# Patient Record
Sex: FEMALE | Race: WHITE
[De-identification: names, ages, dates, MRNs, and addresses within clinical notes are randomized per-mention and may not be internally consistent; named-entity substitution may affect disease eponyms.]

---

## 2017-01-03 ENCOUNTER — HOSPITAL ENCOUNTER (OUTPATIENT)
Dept: HOSPITAL 45 - C.LAB1850 | Age: 27
Discharge: HOME | End: 2017-01-03
Attending: OBSTETRICS & GYNECOLOGY
Payer: COMMERCIAL

## 2017-01-03 DIAGNOSIS — Z3A.00: ICD-10-CM

## 2017-01-03 DIAGNOSIS — O09.299: Primary | ICD-10-CM

## 2017-01-03 LAB — GTGD: 50 GRAMS

## 2017-03-28 ENCOUNTER — HOSPITAL ENCOUNTER (OUTPATIENT)
Dept: HOSPITAL 45 - C.LABSPEC | Age: 27
Discharge: HOME | End: 2017-03-28
Attending: OBSTETRICS & GYNECOLOGY
Payer: COMMERCIAL

## 2017-03-28 ENCOUNTER — HOSPITAL ENCOUNTER (OUTPATIENT)
Dept: HOSPITAL 45 - C.LAB1850 | Age: 27
Discharge: HOME | End: 2017-03-28
Attending: OBSTETRICS & GYNECOLOGY
Payer: COMMERCIAL

## 2017-03-28 DIAGNOSIS — O09.291: Primary | ICD-10-CM

## 2017-03-28 LAB
APPEARANCE UR: CLEAR
BILIRUB UR-MCNC: (no result) MG/DL
COLOR UR: YELLOW
GTGD: 50 GRAMS
HCT VFR BLD CALC: 32.4 % (ref 37–47)
MANUAL MICROSCOPIC REQUIRED?: NO
NITRITE UR QL STRIP: (no result)
PH UR STRIP: 7 [PH] (ref 4.5–7.5)
REVIEW REQ?: NO
SP GR UR STRIP: 1.01 (ref 1–1.03)
URINE EPITHELIAL CELL AUTO: (no result) /LPF (ref 0–5)
UROBILINOGEN UR-MCNC: (no result) MG/DL

## 2017-04-17 ENCOUNTER — HOSPITAL ENCOUNTER (OUTPATIENT)
Dept: HOSPITAL 45 - C.LAB1850 | Age: 27
Discharge: HOME | End: 2017-04-17
Attending: OBSTETRICS & GYNECOLOGY
Payer: COMMERCIAL

## 2017-04-17 DIAGNOSIS — R39.9: Primary | ICD-10-CM

## 2017-04-17 LAB
APPEARANCE UR: CLEAR
BILIRUB UR-MCNC: (no result) MG/DL
COLOR UR: YELLOW
MANUAL MICROSCOPIC REQUIRED?: NO
NITRITE UR QL STRIP: (no result)
PH UR STRIP: 8 [PH] (ref 4.5–7.5)
REVIEW REQ?: NO
SP GR UR STRIP: 1 (ref 1–1.03)
URINE BILL WITH OR WITHOUT MIC: (no result)
UROBILINOGEN UR-MCNC: (no result) MG/DL

## 2017-05-23 ENCOUNTER — HOSPITAL ENCOUNTER (OUTPATIENT)
Dept: HOSPITAL 45 - C.LABSPEC | Age: 27
Discharge: HOME | End: 2017-05-23
Attending: OBSTETRICS & GYNECOLOGY
Payer: COMMERCIAL

## 2017-05-23 DIAGNOSIS — Z34.93: Primary | ICD-10-CM

## 2017-06-17 ENCOUNTER — HOSPITAL ENCOUNTER (INPATIENT)
Dept: HOSPITAL 45 - C.LD | Age: 27
LOS: 13 days | Discharge: HOME | End: 2017-06-30
Attending: OBSTETRICS & GYNECOLOGY | Admitting: OBSTETRICS & GYNECOLOGY
Payer: COMMERCIAL

## 2017-06-17 VITALS
BODY MASS INDEX: 34.6 KG/M2 | WEIGHT: 220.46 LBS | HEIGHT: 67.01 IN | WEIGHT: 220.46 LBS | BODY MASS INDEX: 34.6 KG/M2 | HEIGHT: 67.01 IN

## 2017-06-17 DIAGNOSIS — Z3A.41: ICD-10-CM

## 2017-06-17 DIAGNOSIS — J45.909: ICD-10-CM

## 2017-06-17 DIAGNOSIS — O48.0: Primary | ICD-10-CM

## 2017-06-27 LAB
EOSINOPHIL NFR BLD AUTO: 223 K/UL (ref 130–400)
HCT VFR BLD CALC: 37 % (ref 37–47)
MCH RBC QN AUTO: 29.4 PG (ref 25–34)
MCHC RBC AUTO-ENTMCNC: 33.2 G/DL (ref 32–36)
MCV RBC AUTO: 88.5 FL (ref 80–100)
PMV BLD AUTO: 9.8 FL (ref 7.4–10.4)
RBC # BLD AUTO: 4.18 M/UL (ref 4.2–5.4)
WBC # BLD AUTO: 9.96 K/UL (ref 4.8–10.8)

## 2017-06-27 RX ADMIN — SODIUM CHLORIDE, SODIUM LACTATE, POTASSIUM CHLORIDE, AND CALCIUM CHLORIDE SCH MLS/HR: 600; 310; 30; 20 INJECTION, SOLUTION INTRAVENOUS at 08:42

## 2017-06-27 RX ADMIN — Medication PRN UNITS: at 08:44

## 2017-06-27 RX ADMIN — ROPIVACAINE HYDROCHLORIDE PRN ML: 2 INJECTION, SOLUTION EPIDURAL; INFILTRATION at 22:51

## 2017-06-27 RX ADMIN — SODIUM CHLORIDE, SODIUM LACTATE, POTASSIUM CHLORIDE, AND CALCIUM CHLORIDE SCH MLS/HR: 600; 310; 30; 20 INJECTION, SOLUTION INTRAVENOUS at 18:14

## 2017-06-27 RX ADMIN — ONDANSETRON PRN MG: 2 INJECTION INTRAMUSCULAR; INTRAVENOUS at 17:06

## 2017-06-27 RX ADMIN — ROPIVACAINE HYDROCHLORIDE PRN ML: 2 INJECTION, SOLUTION EPIDURAL; INFILTRATION at 23:45

## 2017-06-27 NOTE — MEDICAL STUDENT: [facility]
Med Student History & Physical


Date of Service


2017.





Chief Complaint


"Induction"





History of Present Illness


Source:  patient, family, partner


Consuelo is a 26 year old  with an NAVI of 17 by LMP of 9/10/17 

confirmed by ultrasound at 41 weeks GA who presents today for her postdate 

induction. 





Patient reports fetal movements and "feels more uncomfortable" at the moment. 

She denies vaginal bleeding and leakage of fluids. 





Her pregnancy has been uncomplicated. She has a past history of depression and 

was taking Lexapro, but was tapered off of it in 2016 in anticipation 

of conceiving with . She anticipates restarting the medication 

postpartum. When asked about her mood during her pregnancy, she says it has 

been "all over the place." 





On Friday, 17, she had an episode of bleeding and loss of mucus plug, 

which she saw after using the restroom. She had a scheduled appointment that 

day with Dr. Mcgee,.who reassured her that the episode was normal. A BPP () 

was performed that day and was reassuring.  Was also reassured by Dr. Gomez at 

her pre-IOL appointment on 17. 





Prenatal labs - 


Initial labs


Blood type - A+


Antibody screen - negative


Chlamydia - negative 


Gonorrhea - negative


Hct/Hgb - 38.9%/13.4% 


MCV - 88 


Plt - 325 


Rubella immune 


Nonreactive VDRL/RPR


HbSAg - negative 


HIV counseling/testing - negative 


Urine culture/screen - negative 


Cystic fibrosis - negative (2012)





1st trimester aneuploidy risk assessment - 2016


Diabetes screen - 104 (1/3/17)





24-28 week labs


Hct/Hgb - 32.4%, 10.9 g/dL


Diabetes screen - 115 (3/38/17)





32-36 week labs


GBS - negative





Reactive nonstress test on 2017.





OB History


G1: Spontaneous  and subsequent D&C in 2012 at 13 weeks GA. 

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


G2: Spontaneous  in 2013 at 6 weeks GA.





GYN History


Menarche occurred at the age of 12. LMP was 9/10/16. No history of abnormal Pap 

smears. Last Pap smear was 10/2015 and was normal. 


Menstrual cycles are typically 28 days long. Menses last 5-7 days and are 

described as heavy for the first few days and taper off. 


Reports hx of OCP use for three years and stopped last summer. 


Denies hx of any STIs.





Past Medical History


Consuelo has a past history of:





1) Migraines - has not had any during pregnancy and previously was not on 

medication for them. 


2) Depression/anxiety - was taking Lexapro for about a year and a half and 

stopped in 2016 when deciding to conceive with . Mood has been 

"all over the place" during pregnancy. 


3) Mild asthma - sports-induced.





Past Surgical History


D&C in 2012 and wisdom tooth surgery.





Family History


Paternal grandmother has pmh of miscarriage, T2DM, and COPD. Maternal 

grandfather has a history of bladder cancer. Otherwise, patient and patient's 

mother deny a history of MI, HTN, hyperlipidemia, stroke, and cancer in any 

other family members, including mother, father, and four siblings.





Social History


Never smoker. Would consume alcohol occasionally at holidays but has had no 

alcohol during pregnancy. No history of illicit drug use. 





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


Smoking Status:  Never Smoker


Alcohol Use:  socially (none throughout pregnancy)


Drug Use:  none


Marital Status:  


Housing status:  lives with family





Allergies


Coded Allergies:  


     No Known Allergies (Unverified , 17)





Home Medications


Multivit/Min/Iron/Fol Ac/Pren (Prenatal Vitamin), 1 TAB PO DAILY





Review of Systems


Constitutional:  No fever, No chills, No sweats


Eyes:  + redness, No worsening of vision, No eye pain


Respiratory:  No shortness of breath, No dyspnea on exertion, No dyspnea at rest


Cardiovascular:  No chest pain, No edema


Abdomen:  + nausea


Musculoskeletal:  + swelling


Genitourinary - Female:  + pregnancy





Physical Exam


VS - T 98.5 degrees F, /83, HR 87, RR 18





Consuelo is a well-nourished, well-developed patient who was awake and alert 

during the interview. 


Heart rate was normal with no gallops or murmurs on auscultation. Lungs were 

clear to auscultation bilaterally. Abdominal exam revealed a soft, non-tender 

uterus. Fundus measured 43.5 cm. Examination of lower extremities was negative 

for pedal edema. Calves were tender to palpation but pt did not complain 

acutely of any pain. Cervix exam performed on admission per Dr. Gomez - 3/50%/-

2/anterior/soft.





Fetal Monitoring


External Fetal Monitor:


EFM shows moderate variability. Accelerations were 15 x 15. No decelerations. 

Fetal heart rate ranges from 130-135 bpm.


Tocodynamometer:


Contractions occurring every 1.5 to 3.5 minutes. Patient is beginning to feel 

uncomfortable.





Laboratory Results


17 08:31

















Test


  17


08:31


 


Red Blood Count


  4.18 M/uL


(4.2-5.4)


 


Mean Corpuscular Volume


  88.5 fL


()


 


Mean Corpuscular Hemoglobin


  29.4 pg


(25-34)


 


Mean Corpuscular Hemoglobin


Concent 33.2 g/dl


(32-36)


 


RDW Standard Deviation


  44.7 fL


(36.4-46.3)


 


RDW Coefficient of Variation


  13.8 %


(11.5-14.5)


 


Mean Platelet Volume


  9.8 fL


(7.4-10.4)











Assessment and Plan


Consuelo is a 26 year old  at 41 weeks gestation who presents today for 

postdate with no complications. Fetal tracing category I. 


Expectant management for now. Continue with EFM and toco.

## 2017-06-28 VITALS
SYSTOLIC BLOOD PRESSURE: 151 MMHG | TEMPERATURE: 97.88 F | OXYGEN SATURATION: 97 % | DIASTOLIC BLOOD PRESSURE: 85 MMHG | HEART RATE: 118 BPM

## 2017-06-28 VITALS
OXYGEN SATURATION: 97 % | HEART RATE: 103 BPM | TEMPERATURE: 98.06 F | DIASTOLIC BLOOD PRESSURE: 75 MMHG | SYSTOLIC BLOOD PRESSURE: 112 MMHG

## 2017-06-28 VITALS
DIASTOLIC BLOOD PRESSURE: 72 MMHG | OXYGEN SATURATION: 98 % | HEART RATE: 102 BPM | TEMPERATURE: 97.88 F | SYSTOLIC BLOOD PRESSURE: 109 MMHG

## 2017-06-28 VITALS
HEART RATE: 90 BPM | OXYGEN SATURATION: 99 % | SYSTOLIC BLOOD PRESSURE: 113 MMHG | DIASTOLIC BLOOD PRESSURE: 79 MMHG | TEMPERATURE: 98.24 F

## 2017-06-28 PROCEDURE — 10H07YZ INSERTION OF OTHER DEVICE INTO PRODUCTS OF CONCEPTION, VIA NATURAL OR ARTIFICIAL OPENING: ICD-10-PCS | Performed by: OBSTETRICS & GYNECOLOGY

## 2017-06-28 PROCEDURE — 0KQM0ZZ REPAIR PERINEUM MUSCLE, OPEN APPROACH: ICD-10-PCS | Performed by: OBSTETRICS & GYNECOLOGY

## 2017-06-28 PROCEDURE — 3E033VJ INTRODUCTION OF OTHER HORMONE INTO PERIPHERAL VEIN, PERCUTANEOUS APPROACH: ICD-10-PCS | Performed by: OBSTETRICS & GYNECOLOGY

## 2017-06-28 PROCEDURE — 0UQMXZZ REPAIR VULVA, EXTERNAL APPROACH: ICD-10-PCS | Performed by: OBSTETRICS & GYNECOLOGY

## 2017-06-28 RX ADMIN — Medication PRN MG: at 19:53

## 2017-06-28 RX ADMIN — DOCUSATE SODIUM SCH MG: 100 CAPSULE, LIQUID FILLED ORAL at 19:53

## 2017-06-28 RX ADMIN — SODIUM CHLORIDE, SODIUM LACTATE, POTASSIUM CHLORIDE, AND CALCIUM CHLORIDE SCH MLS/HR: 600; 310; 30; 20 INJECTION, SOLUTION INTRAVENOUS at 04:18

## 2017-06-28 RX ADMIN — Medication PRN UNITS: at 06:18

## 2017-06-28 RX ADMIN — DOCUSATE SODIUM SCH MG: 100 CAPSULE, LIQUID FILLED ORAL at 08:30

## 2017-06-28 RX ADMIN — ACETAMINOPHEN AND CODEINE PHOSPHATE PRN TAB: 30; 300 TABLET ORAL at 17:29

## 2017-06-28 RX ADMIN — Medication SCH TAB: at 08:30

## 2017-06-28 RX ADMIN — Medication PRN MG: at 08:09

## 2017-06-28 RX ADMIN — ACETAMINOPHEN AND CODEINE PHOSPHATE PRN TAB: 30; 300 TABLET ORAL at 12:30

## 2017-06-28 RX ADMIN — ONDANSETRON PRN MG: 2 INJECTION INTRAMUSCULAR; INTRAVENOUS at 04:18

## 2017-06-28 NOTE — [facility] OPERATIVE REPORT
Operative Report


Operative Date


2017.





Pre-Operative Diagnosis





iup at 41 weeks


induction of labor





Post-Operative Diagnosis


same





Procedure(s) Performed


1.  pitocin augmentation


2.  epidural


3.  arom


4.  


5.  right sulcal tear, second degree and right labial lac with repair





Surgeon


Patricia





Assistant Surgeon(s)


none





Estimated Blood Loss


500cc





Findings


viable male infant





Fluids


n/a





Specimens





placenta





Drains


none





Anesthesia


epidural





Complication(s)


None





Disposition


L&D





Description of Procedure


Patient pushed well to deliver a viable male infant in YULI.  Loose nuchal x 1 

reduced.  Anterior shoulder and rest of the baby delivered without difficulty.  

Nose and mouth suctioned and cord clamped and cut.  Infant placed on  maternal 

abdomen.  cord blood obtained for collection.  Placenta manually extracted.  

Uterus swept and cleared of all clot debris. Right labial laceration identified 

and repaired with 3-0 vicryl.  Second degree perineal and right labial lac then 

repaired with 3-0 and 4-0 vicryl.  Hemostasis with dilute pitocin  and massage.

  ebl--500cc.  mother and baby doing well at the end of the delivery.


I attest to the content of the Intraoperative Record and any orders documented 

therein.  Any exceptions are noted below.

## 2017-06-28 NOTE — ANESTHESIA PROCEDURE NOTE
Anesthesia Epidural Removal Nt


Date & Time


Jun 28, 2017 at 07:23





Vital Signs


Pain Intensity:  0.0





Notes


Mental Status:  alert / awake / arousable, participated in evaluation


Nausea / Vomiting:  adequately controlled


Pain:  adequately controlled


Airway Patency, RR, SpO2:  stable & adequate


BP & HR:  stable & adequate


Hydration State:  stable & adequate


Neuraxial Anesthesia:  was administered


Anesthetic Complications:  no major complications apparent, pt satisfied with 

anesthetic care


Epidural:  removed without complications, with tip intact

## 2017-06-29 VITALS
TEMPERATURE: 97.88 F | DIASTOLIC BLOOD PRESSURE: 75 MMHG | SYSTOLIC BLOOD PRESSURE: 113 MMHG | HEART RATE: 103 BPM | OXYGEN SATURATION: 98 %

## 2017-06-29 VITALS
OXYGEN SATURATION: 98 % | DIASTOLIC BLOOD PRESSURE: 81 MMHG | TEMPERATURE: 97.88 F | HEART RATE: 101 BPM | SYSTOLIC BLOOD PRESSURE: 123 MMHG

## 2017-06-29 VITALS
DIASTOLIC BLOOD PRESSURE: 74 MMHG | HEART RATE: 98 BPM | TEMPERATURE: 97.52 F | OXYGEN SATURATION: 98 % | SYSTOLIC BLOOD PRESSURE: 109 MMHG

## 2017-06-29 VITALS
TEMPERATURE: 97.88 F | DIASTOLIC BLOOD PRESSURE: 68 MMHG | OXYGEN SATURATION: 98 % | HEART RATE: 103 BPM | SYSTOLIC BLOOD PRESSURE: 108 MMHG

## 2017-06-29 VITALS — SYSTOLIC BLOOD PRESSURE: 113 MMHG | TEMPERATURE: 98.06 F | HEART RATE: 92 BPM | DIASTOLIC BLOOD PRESSURE: 73 MMHG

## 2017-06-29 LAB — HCT VFR BLD CALC: 27.5 % (ref 37–47)

## 2017-06-29 RX ADMIN — DOCUSATE SODIUM SCH MG: 100 CAPSULE, LIQUID FILLED ORAL at 08:59

## 2017-06-29 RX ADMIN — ACETAMINOPHEN AND CODEINE PHOSPHATE PRN TAB: 30; 300 TABLET ORAL at 20:22

## 2017-06-29 RX ADMIN — DOCUSATE SODIUM SCH MG: 100 CAPSULE, LIQUID FILLED ORAL at 20:22

## 2017-06-29 RX ADMIN — Medication PRN MG: at 14:57

## 2017-06-29 RX ADMIN — Medication SCH TAB: at 08:59

## 2017-06-29 NOTE — PROGRESS NOTE
Subjective


Jun 29, 2017.


Subjective


conversation w/ patient, physical exam


Ambulation:  ambulating normally


Voiding:  no voiding problems


Passing Gas:  Yes


Diet Tolerance:  Regular Diet


Lochia:  Moderate


Feeding Type:  Breast Feeding


Pain:  controlled





Review of Systems


Constitutional:  No problem reported


Respiratory:  No problem reported


Cardiac:  No problem reported


Breast:  No problem reported


Abdomen:  No problem reported


Female :  No problem reported





Objective


Vital Signs











  Date Time  Temp Pulse Resp B/P (MAP) Pulse Ox O2 Delivery O2 Flow Rate FiO2


 


6/29/17 08:15 36.6 103 18 108/68 (81) 98 Room Air  


 


6/29/17 04:30 36.4 98 16 109/74 (86) 98 Room Air  


 


6/29/17 00:00 36.6 101 18 123/81 (95) 98 Room Air  


 


6/29/17 00:00      Room Air  


 


6/28/17 19:55 36.8 90 20 113/79 (90) 99 Room Air  


 


6/28/17 19:55      Room Air  


 


6/28/17 15:39      Room Air  


 


6/28/17 15:36 36.6 102 16 109/72 (84) 98 Room Air  


 


6/28/17 12:12 36.7 103 16 112/75 (87) 97 Room Air  











Physical Exam


General Appearance:  WELL-APPEARING, NO APPARENT DISTRESS


Respiratory/Chest:  no respiratory distress


Cardiovascular:  regular rate, rhythm


Abdomen:  non tender, soft


Fundus:  Firm


Extremities:  normal inspection





Laboratory Results





Last 24 Hours








Test


  6/29/17


07:53


 


Hemoglobin 9.2 g/dL 


 


Hematocrit 27.5 % 











Assessment and Plan


Post-Partum


Day#:  1


Continue Routine Care:


PPD#1 doing well. Continue routine postpartum care.

## 2017-06-29 NOTE — MEDICAL STUDENT: [facility]
Med Student OB/GYN Progress Nt


Date of Service


2017.





Subjective


conversation w/ patient


Ambulation:  ambulating normally


Voiding:  no voiding problems


Passing Gas:  Yes


Diet Tolerance:  Regular Diet


Lochia:  Small


Feeding Type:  Bottle Feeding


Pain:  1/10 pain


Notes:


Pt is doing well. She was walking around the unit prior to the interview. 





Bleeding has improved, pt has not noticed any clots. Reports little pain 

throughout the night, described as a 1/10. Had no acute complaints or concerns.





Review of Systems


Constitutional:  No fever, No chills, No sweats


Respiratory:  No shortness of breath, No dyspnea on exertion, No dyspnea at rest


Cardiac:  No chest pain


Breast:  No nipple discharge, No breast pain


Abdomen:  No nausea, No vomiting


Female :  No dysuria


Pt reports generalized itchiness.





Objective


Vital Signs











  Date Time  Temp Pulse Resp B/P (MAP) Pulse Ox O2 Delivery O2 Flow Rate FiO2


 


17 08:15 36.6 103 18 108/68 (81) 98 Room Air  


 


17 04:30 36.4 98 16 109/74 (86) 98 Room Air  


 


17 00:00 36.6 101 18 123/81 (95) 98 Room Air  


 


17 00:00      Room Air  


 


17 19:55 36.8 90 20 113/79 (90) 99 Room Air  


 


17 19:55      Room Air  


 


17 15:39      Room Air  


 


17 15:36 36.6 102 16 109/72 (84) 98 Room Air  


 


17 12:12 36.7 103 16 112/75 (87) 97 Room Air  


 


17 09:12 36.6 118 16 151/85 (107) 97 Room Air  


 


17 09:12      Room Air  











Physical Exam


General Appearance:  WELL-APPEARING, WD/WN, NO APPARENT DISTRESS


Respiratory/Chest:  lungs clear, normal breath sounds, no respiratory distress


Cardiovascular:  regular rate, rhythm, no gallop, no murmur


Abdomen:  non tender, soft


Fundus:  Firm, Relation to Umbilicus (at umbilicus)


Extremities:  no calf tenderness, + pedal edema (mild edema)





Laboratory Results





Last 24 Hours








Test


  17


07:53


 


Hemoglobin 9.2 g/dL 


 


Hematocrit 27.5 % 











Assessment and Plan


Post-Partum


Day Number:  1


Continue Routine Care:


Consuelo is a 26 year old  female who presented on  for her 

induction. 





Hct - 37 --> 27.5


Hb - 12.3 --> 9.2 





Continue routine care. 


Monitor pain, bleeding, and mood. 


Encourage further ambulation. 


Continue bottle feeding.

## 2017-06-29 NOTE — PROGRESS NOTE
Subjective


2017.


Subjective


conversation w/ patient


Ambulation:  ambulating normally


Voiding:  no voiding problems


Passing Gas:  Yes


Diet Tolerance:  Regular Diet


Lochia:  Small


Feeding Type:  Bottle Feeding


Comment:


The patient was seen and examined at bedside.  No acute overnight events.  Pt 

received Ibuprofen at 8pm and Percocet at 6pm.  Delivered a baby boy. 





Patient is resting comfortably in bed.  Denies having any pain.  Eating and 

urinating well. 





Plan of care was described to the patient and all questions were answered.





Review of Systems


Constitutional:  No fever, No chills


Respiratory:  No cough, No sputum, No shortness of breath


Cardiac:  No chest pain


Abdomen:  No pain, No nausea, No vomiting


Female :  No dysuria





Objective


Vital Signs











  Date Time  Temp Pulse Resp B/P (MAP) Pulse Ox O2 Delivery O2 Flow Rate FiO2


 


17 08:15 36.6 103 18 108/68 (81) 98 Room Air  


 


17 04:30 36.4 98 16 109/74 (86) 98 Room Air  


 


17 00:00 36.6 101 18 123/81 (95) 98 Room Air  


 


17 00:00      Room Air  


 


17 19:55 36.8 90 20 113/79 (90) 99 Room Air  


 


17 19:55      Room Air  


 


17 15:39      Room Air  


 


17 15:36 36.6 102 16 109/72 (84) 98 Room Air  


 


17 12:12 36.7 103 16 112/75 (87) 97 Room Air  


 


17 09:12 36.6 118 16 151/85 (107) 97 Room Air  


 


17 09:12      Room Air  











Physical Exam


General Appearance:  WELL-APPEARING, WD/WN, NO APPARENT DISTRESS


Respiratory/Chest:  chest non-tender, lungs clear, normal breath sounds, no 

respiratory distress, no accessory muscle use


Cardiovascular:  regular rate, rhythm, no edema, no gallop, no JVD, no murmur


Abdomen:  normal bowel sounds, non tender, soft, no organomegaly, no pulsatile 

mass


Fundus:  Firm, Non-Tender, Relation to Umbilicus (at the level of the umbilicus)


Extremities:  normal range of motion, non-tender, normal inspection, no pedal 

edema, no calf tenderness





Laboratory Results





Last 24 Hours








Test


  17


07:53


 


Hemoglobin 9.2 g/dL 


 


Hematocrit 27.5 % 











Assessment and Plan


Post-Partum


Day#:  1


Continue Routine Care:


26F  presents for an induction.  Delivered a baby boy. 





GBS-, A+, Rubella Immune


Vital signs reviewed and stable.  Pt slightly tachycardic.  BP good. Oxygen sat 

excellent.   Continue to monitor. 


Hemoglobin 12.3-->9.2


Continue bottle feeding.


Monitor lochia, monitor mood, control pain with PO medications PRN.


Continue routine post  care.


Resident Involvement:  Resident Care Provided


Care Provided:  [facility]

## 2017-06-30 VITALS — DIASTOLIC BLOOD PRESSURE: 79 MMHG | SYSTOLIC BLOOD PRESSURE: 123 MMHG | HEART RATE: 94 BPM | TEMPERATURE: 98.24 F

## 2017-06-30 RX ADMIN — DOCUSATE SODIUM SCH MG: 100 CAPSULE, LIQUID FILLED ORAL at 08:43

## 2017-06-30 RX ADMIN — ACETAMINOPHEN AND CODEINE PHOSPHATE PRN TAB: 30; 300 TABLET ORAL at 06:17

## 2017-06-30 RX ADMIN — Medication SCH TAB: at 08:43

## 2017-06-30 RX ADMIN — Medication PRN MG: at 12:26

## 2017-06-30 NOTE — DISCHARGE INSTRUCTIONS
Discharge Instructions


Date of Service


2017.





Admission


Reason for Admission:  Induction





Discharge


Discharge Diagnosis / Problem:  





Discharge Goals


Goal(s):  Routine recovery after delivery





Activity Recommendations


Activity Limitations:  per Instructions/Follow-up section





.





Instructions / Follow-Up


Instructions / Follow-Up





ACTIVITY RECOMMENDATIONS:





* Gradual return to full activity over the next 2-3 weeks.


* No lifting - nothing heavier than baby over the next 2-3 weeks.


* Do not engage in vigorous exercise, sexual activity or sports until cleared by


   your physician.


* Do not drive or operate any motorized equipment until cleared by your 

physician.


* You may shower/bathe daily.








MEDICATIONS:





For discomfort or pain, you may use Acetaminophen (Tylenol), Ibuprofen (Advil),


or Naproxen (Aleve) following the package directions. For constipation you may 


use Colace following the package directions.








BREAST CARE:





If you are not breast feeding:





*  Wear a supportive bra 24 hours a day for one to two weeks.


*  Avoid stimulating your breasts and nipples as much as possible during the 

first 


    few weeks after delivery.


*  When taking a shower, have the warm water hit your back, not breasts.


*  When your breasts feel full, apply ice packs.  Usually three to four times a 

day


    helps ease the discomfort.


*  Take a mild pain medication (Tylenol / Motrin) when you are uncomfortable.





If breast feeding:





*  Use breast milk to lubricate nipples.  Lansinoh cream may be used for sore 

nipples. 


    You do not need to remove cream prior to breast feeding.  If using a 

different brand


   of cream, check the label for directions regarding removal of cream prior to 

nursing.


*  Wear a supportive bra.


*  If having problems with breasts or breast feeding, call a lactation 

consultant 


    or your health care provider.








EPISIOTOMY CARE:





After delivery, if you have an episiotomy (stitches), the following steps will 

ease


discomfort and aid healing.





*  For the first 24 hours after delivery, place ice packs next to your 

episiotomy to


   help reduce swelling.


*  After the first 24 hour-period, sitz baths, either portable or in the tub, 

are suggested.


    A shower with a shower arm sprayed over the episiotomy may be comforting.


*  Oralia care should be done after each voiding and bowel movement.  Squirt warm 

water


    from a plastic bottle over the perineum (region of the body between the 

anus and 


    urinary opening) and pat dry.


*  Use Dermoplast to ease discomfort.  Shake container.  Spray directly over 

the 


    episiotomy.  Place a Tucks on a clean sanitary pad next to your episiotomy.








SPECIAL CARE INSTRUCTIONS:





When you are discharged from the hospital, it is important for you to follow 

the instructions 


listed below:





*  During the first week at home, you should be able to care for yourself and 

your baby.


    In addition, the usual light household activities are encouraged.





*  Limit your activities to the way you feel.  Do not try to clean the house or 

move 


    furniture. Be sensible.





*  If you actively engage in sports and have done so up until the time of your 

delivery, 


    you may resume these activities as soon as you feel able.  This may take up 

to one 


    month or even longer.  Use good judgment.





*  Continue to take your prenatal vitamins for at least six weeks after the 

birth of your baby.





*  Your diet need not be limited unless you were on a special diet before your 

delivery.  


    Breast-feeding mothers need around 2500 calories per day and at least 64-80 

ounces of 


    fluid per day (8 to 10 glasses).





*  You should eat foods from the four major food groups.  Crash diets or fad 

diets are to be 


    avoided.  Eating lean meats, fresh fruits and vegetables, low-fat dairy 

products, high fiber


    foods and a regular exercise program, will help you get back to your pre-

pregnancy weight


    without putting your health at risk.





*  Constipation is sometimes a problem after delivery.  Take a mild laxative as 

needed.  If 


    breast feeding, Milk of Magnesia is acceptable to use. You may use a 

suppository or Fleets


    enema if no episiotomy.





*  A daily shower or tub bath is suggested.  Be sure to thoroughly and gently 

dry the perineum.





*  A bloody vaginal discharge will usually continue until around four weeks 

post partum.  A 


    small amount of bleeding may continue for as long as six weeks.  Vaginal 

discharge changes


    from the bright red bleeding after delivery to pink then brownish and 

finally yellowish-pink 


    before becoming white and disappearing.





*  Bleeding may increase with activity.  Your first period may come in 4-8 

weeks.  If you are 


    breast feeding, your period may be delayed even longer.





*  [place] (sex) can begin whenever both you and your partner feel 

comfortable and do 


    not have any form of genital infection.  It is recommended that you wait at 

least six weeks


    for internal and external healing to occur.  If you have questions, please 

talk to your health


    care practitioner.  A condom should be used to prevent infection and 

pregnancy.





*  Foreplay, gentle intercourse and lubrication is very important the first 

several times to 


    prevent pain.  A water-based lubricant such as K-Y jelly or Astroglide may 

be used.





*  If you have RH negative blood and your baby is RH positive, you will receive 

RHOGAM by 


    injection prior to discharge.  The nurse will give you a card to keep with 

you that has the


    date and place that you received RHOGAM after delivery.





*  During your prenatal care, you had a Rubella screen done to check for the 

presence of 


    rubella antibodies in your blood.  If your test was negative, you will 

receive a Rubella 


    vaccine prior to discharge.  This vaccine may cause a fever, soreness at 

the injection site


    and flu-like symptoms.  If these symptoms persist, notify your health care 

practitioner.  


    Pregnancy is not advised for one month after a Rubella vaccine.





*  Verbalizes understanding of car seat law as reviewed with patient nursing.





*  Car Seat hand-out given and reviewed with patient by nursing.





*  Shaken baby information reviewed with patient by nursing.


 


Call you doctor if:





*  Heavy bleeding (saturating several pads an hour) or passing clots the size 

of your fist.


*  A fever >101 degrees F (38.3 degrees C) on two occasions four hours apart and

/or chills.


*  Unusual pain in the pelvic or vaginal areas.


* "Baby Blues" lasting longer than two weeks.





If you have any questions or concerns, call your health care practitioner at 


[phone].








FOLLOW UP VISIT:





*  Please call the office at [phone] to schedule a 6 week postpartum 


    examination.  It is important you keep this appointment.  It is important 


    for you to make arrangements for either yearly or twice yearly check-ups 


    thereafter.





Current Hospital Diet


Patient's current hospital diet: Regular OB Diet





Discharge Diet


Recommended Diet:  Regular OB Diet





Pending Studies


Studies pending at discharge:  no





Medical Emergencies








.


Who to Call and When:





Medical Emergencies:  If at any time you feel your situation is an emergency, 

please call 911 immediately.





.





Non-Emergent Contact


Non-Emergency issues call your:  Gynecologist





.


.








"Provider Documentation" section prepared by Corby Boateng.








.





VTE Core Measure


Inpt VTE Proph given/why not?:  Treatment not indicated

## 2017-06-30 NOTE — PROGRESS NOTE
Subjective


Jun 30, 2017.


Subjective


conversation w/ patient, physical exam, lab review


Ambulation:  ambulating normally


Voiding:  no voiding problems


Diet Tolerance:  Regular Diet


Lochia:  Small





Objective


Vital Signs











  Date Time  Temp Pulse Resp B/P (MAP) Pulse Ox O2 Delivery O2 Flow Rate FiO2


 


6/29/17 23:30 36.7 92 16 113/73 (86)    


 


6/29/17 23:30      Room Air  


 


6/29/17 15:40 36.6 103 18 113/75 (88) 98 Room Air  


 


6/29/17 15:40     98 Room Air  


 


6/29/17 08:55      Room Air  


 


6/29/17 08:15 36.6 103 18 108/68 (81) 98 Room Air  











Physical Exam


General Appearance:  WELL-APPEARING


Abdomen:  non tender


Fundus:  Firm


Extremities:  no calf tenderness





Laboratory Results





Last 24 Hours








Test


  6/29/17


07:53


 


Hemoglobin 9.2 g/dL 


 


Hematocrit 27.5 % 











Assessment and Plan


Post-Partum


Day#:  2


Continue Routine Care:


home

## 2017-07-07 ENCOUNTER — HOSPITAL ENCOUNTER (EMERGENCY)
Dept: HOSPITAL 45 - C.EDB | Age: 27
Discharge: HOME | End: 2017-07-07
Payer: COMMERCIAL

## 2017-07-07 VITALS
BODY MASS INDEX: 31.97 KG/M2 | WEIGHT: 203.71 LBS | WEIGHT: 203.71 LBS | HEIGHT: 67.01 IN | BODY MASS INDEX: 31.97 KG/M2 | HEIGHT: 67.01 IN

## 2017-07-07 VITALS — TEMPERATURE: 97.52 F

## 2017-07-07 VITALS — OXYGEN SATURATION: 96 % | DIASTOLIC BLOOD PRESSURE: 91 MMHG | SYSTOLIC BLOOD PRESSURE: 141 MMHG | HEART RATE: 58 BPM

## 2017-07-07 VITALS — OXYGEN SATURATION: 99 %

## 2017-07-07 DIAGNOSIS — D64.9: ICD-10-CM

## 2017-07-07 DIAGNOSIS — R06.02: ICD-10-CM

## 2017-07-07 DIAGNOSIS — F41.9: Primary | ICD-10-CM

## 2017-07-07 LAB
ANION GAP SERPL CALC-SCNC: 10 MMOL/L (ref 3–11)
BASOPHILS # BLD: 0.03 K/UL (ref 0–0.2)
BASOPHILS NFR BLD: 0.4 %
BUN SERPL-MCNC: 10 MG/DL (ref 7–18)
BUN/CREAT SERPL: 16.8 (ref 10–20)
CALCIUM SERPL-MCNC: 9 MG/DL (ref 8.5–10.1)
CHLORIDE SERPL-SCNC: 108 MMOL/L (ref 98–107)
CO2 SERPL-SCNC: 22 MMOL/L (ref 21–32)
COMPLETE: YES
CREAT CL PREDICTED SERPL C-G-VRATE: 165.8 ML/MIN
CREAT SERPL-MCNC: 0.6 MG/DL (ref 0.6–1.2)
EOSINOPHIL NFR BLD AUTO: 407 K/UL (ref 130–400)
GLUCOSE SERPL-MCNC: 81 MG/DL (ref 70–99)
HCT VFR BLD CALC: 32.5 % (ref 37–47)
IG%: 2.7 %
IMM GRANULOCYTES NFR BLD AUTO: 27.3 %
LYMPHOCYTES # BLD: 2.25 K/UL (ref 1.2–3.4)
MCH RBC QN AUTO: 29.2 PG (ref 25–34)
MCHC RBC AUTO-ENTMCNC: 32.6 G/DL (ref 32–36)
MCV RBC AUTO: 89.5 FL (ref 80–100)
MONOCYTES NFR BLD: 6.7 %
NEUTROPHILS # BLD AUTO: 2.2 %
NEUTROPHILS NFR BLD AUTO: 60.7 %
PMV BLD AUTO: 9.2 FL (ref 7.4–10.4)
POTASSIUM SERPL-SCNC: 4.1 MMOL/L (ref 3.5–5.1)
RBC # BLD AUTO: 3.63 M/UL (ref 4.2–5.4)
SODIUM SERPL-SCNC: 140 MMOL/L (ref 136–145)
WBC # BLD AUTO: 8.25 K/UL (ref 4.8–10.8)

## 2017-07-07 NOTE — EMERGENCY ROOM VISIT NOTE
History


Report prepared by Katja:  Kamlesh Tate


Under the Supervision of:  Dr. Pia Araya D.O.


First contact with patient:  01:17


Chief Complaint:  SHORTNESS OF BREATH


Stated Complaint:  HARD TO BREATHE,PANIC ATTACK





History of Present Illness


The patient is a 26 year old female who presents to the Emergency Room with 

complaints of shortness of breath that occurred recently. She states that she 

was asleep for about 30 minutes when she awoke very suddenly. She felt very 

short of breath, her chest was tight, and she felt over-heated, nauseated, and 

a heaviness in her head. She then began having panic attack. She has a past 

medical history of anxiety. 8 days ago, she had a vaginal delivery of her first 

child. Prior to this pregnancy, she was taking Escitalopram for her anxiety. 

She is going to start taking it again. She states she is not depressed, but she 

is emotional. In regards to her physical symptoms, they are still present, but 

not as bad. She states that she frequently wakes up at night secondary to pain 

that occurred because of her childbirth. She is not having any issues passing 

stool. She states she lost a moderate amount of blood in the delivery, so she 

is taking iron supplements. She is still experiencing bleeding that she 

describes as a menstrual period.





   Source of History:  patient


   Onset:  recently


   Position:  other (Respiratory system)


   Symptom Intensity:  moderate


   Quality:  other (Shortness of breath)


   Timing:  constant


   Associated Symptoms:  + headache (Heaviness), + diaphoresis, + chest pain (

Tightness), + nausea, No melena, No hematochezia, No diarrhea





Review of Systems


See HPI for pertinent positives & negatives. A total of 10 systems reviewed and 

were otherwise negative.





Past Medical & Surgical


Medical Problems:


(1) Pregnancy with 41 completed weeks gestation








Family History





Patient reports no known family medical history.





Social History


Smoking Status:  Never Smoker


Smokeless Tobacco Use:  No


Alcohol Use:  none


Drug Use:  none


Marital Status:  


Housing Status:  lives with family





Current/Historical Medications


Scheduled


Docusate Sodium (Colace), 1 CAP PO BID


Ferrous Gluconate (Iron), Unknown Dose PO DAILY


Lorazepam (Ativan), 1 MG PO Q6


Multivit/Min/Iron/Fol Ac/Pren (Prenatal Vitamin), 1 TAB PO DAILY





Scheduled PRN


Ibuprofen (Advil), 200-600 MG PO Q4H PRN for Pain





Allergies


Coded Allergies:  


     No Known Allergies (Unverified , 7/7/17)





Physical Exam


Vital Signs











  Date Time  Temp Pulse Resp B/P (MAP) Pulse Ox O2 Delivery O2 Flow Rate FiO2


 


7/7/17 03:26  58 16 141/91 96   


 


7/7/17 02:10  62 16 125/81 98 Room Air  


 


7/7/17 01:26  69      


 


7/7/17 01:21     99 Room Air  


 


7/7/17 01:21     99 Room Air  


 


7/7/17 01:13 36.4 68 16 145/89 98 Room Air  











Physical Exam


HEENT: Head - normocephalic and atraumatic   Pupils are equal, round, and 

reactive to light.  Extraocular eye muscles are intact, and sclera are 

anicteric. Conjunctival pallor.  Nose -  moist nasal mucosa without discharge. 

Mouth - moist buccal mucosa.  Oropharynx is nonerythematous and there is no 

tonsillar exudate or edema noted.


Neck: Supple; no JVD, nuchal rigidity, cervical lymphadenopathy.


Heart: Regular rate and rhythm.  There is a normal S1 and S2 with no murmurs, 

clicks, or gallops appreciated.


Lungs: Clear to auscultation bilaterally with no wheezes, rales, or rhonchi.


Abdomen: Soft, diffuse mild tenderness, nondistended, with good bowel sounds.  

There are no palpable pulsatile masses or hepatosplenomegaly.  There is no 

guarding, rigidity, or rebound noted.


Extremities: No evidence of cyanosis, clubbing, or edema.  There are easily 

palpable peripheral pulses.


Skin: Pale, mildly diaphoretic, no rashes.





Medical Decision & Procedures


Laboratory Results


7/7/17 01:25








Red Blood Count 3.63, Mean Corpuscular Volume 89.5, Mean Corpuscular Hemoglobin 

29.2, Mean Corpuscular Hemoglobin Concent 32.6, Mean Platelet Volume 9.2, 

Neutrophils (%) (Auto) 60.7, Lymphocytes (%) (Auto) 27.3, Monocytes (%) (Auto) 

6.7, Eosinophils (%) (Auto) 2.2, Basophils (%) (Auto) 0.4, Neutrophils # (Auto) 

5.02, Lymphocytes # (Auto) 2.25, Monocytes # (Auto) 0.55, Eosinophils # (Auto) 

0.18, Basophils # (Auto) 0.03





7/7/17 01:25

















Test


  7/7/17


01:25


 


White Blood Count


  8.25 K/uL


(4.8-10.8)


 


Red Blood Count


  3.63 M/uL


(4.2-5.4)


 


Hemoglobin


  10.6 g/dL


(12.0-16.0)


 


Hematocrit 32.5 % (37-47) 


 


Mean Corpuscular Volume


  89.5 fL


()


 


Mean Corpuscular Hemoglobin


  29.2 pg


(25-34)


 


Mean Corpuscular Hemoglobin


Concent 32.6 g/dl


(32-36)


 


Platelet Count


  407 K/uL


(130-400)


 


Mean Platelet Volume


  9.2 fL


(7.4-10.4)


 


Neutrophils (%) (Auto) 60.7 % 


 


Lymphocytes (%) (Auto) 27.3 % 


 


Monocytes (%) (Auto) 6.7 % 


 


Eosinophils (%) (Auto) 2.2 % 


 


Basophils (%) (Auto) 0.4 % 


 


Neutrophils # (Auto)


  5.02 K/uL


(1.4-6.5)


 


Lymphocytes # (Auto)


  2.25 K/uL


(1.2-3.4)


 


Monocytes # (Auto)


  0.55 K/uL


(0.11-0.59)


 


Eosinophils # (Auto)


  0.18 K/uL


(0-0.5)


 


Basophils # (Auto)


  0.03 K/uL


(0-0.2)


 


RDW Standard Deviation


  43.3 fL


(36.4-46.3)


 


RDW Coefficient of Variation


  13.2 %


(11.5-14.5)


 


Immature Granulocyte % (Auto) 2.7 % 


 


Immature Granulocyte # (Auto)


  0.22 K/uL


(0.00-0.02)


 


Anion Gap


  10.0 mmol/L


(3-11)


 


Est Creatinine Clear Calc


Drug Dose 165.8 ml/min 


 


 


Estimated GFR (


American) 145.8 


 


 


Estimated GFR (Non-


American 125.8 


 


 


BUN/Creatinine Ratio 16.8 (10-20) 


 


Calcium Level


  9.0 mg/dl


(8.5-10.1)





Laboratory results per my review.





Medications Administered











 Medications


  (Trade)  Dose


 Ordered  Sig/Jake


 Route  Start Time


 Stop Time Status Last Admin


Dose Admin


 


 Ondansetron HCl


  (Zofran Inj)  4 mg  STK-MED ONCE


 .ROUTE  7/7/17 01:30


 7/7/17 01:31 DC 7/7/17 01:34


4 MG


 


 Lorazepam


  (Ativan Inj)  1 mg  NOW  STAT


 IV  7/7/17 01:38


 7/7/17 01:41 DC 7/7/17 01:44


1 MG











Procedure


Zofran Inj 4 mg IV





Ativan Inj 1 mg IV





ECG


Indication:  SOB/dyspnea


Rate (beats per minute):  68


Rhythm:  normal sinus


Findings:  no acute ischemic change, no ectopy





ED Course


0117: Past medical records reviewed. The patient was evaluated in room A11B. A 

complete history and physical exam was performed.  An IV lock was initiated and 

labs were drawn as above.  A 12 EKG was obtained as described above.





0130: The patient complained of nausea and I ordered Zofran Inj 4 mg IV





0138: Ordered Ativan Inj 1 mg IV





0315: Upon reevaluation, she is resting. She no longer feels anxious.  I 

discussed findings and results with her. She verbalized agreement of the 

treatment plan. She was discharged home.





Medical Decision


The patient is a 26 year old female who presents to the ED with shortness of 

breath. Differential diagnosis includes anemia, anxiety, sleep deprivation, and 

hypoglycemia. 





I attest that I have personally reviewed the patient's current medication list. 





Blood Pressure Screening: Patient was found to have a slightly elevated blood 

pressure due to circumstances. I do not believe that the patient requires 

hypertension monitoring. 





Laboratory Results:





Hemoglobin 10.6 - up from 9.2 last week, MCV is 89.5, no leukocytosis, glucose 

and renal function are normal. 





The patient has a history of anxiety.  She had  been taken off of her anti- 

anxiety meds because of the pregnancy.  She has delivered the child 

approximately one week ago and is interested in restarting her medications.  

She  plans to follow with Dr. greenwood with regards to this.  The patient awoke 

from sleep tonight having a panic attack.  She received IV Ativan with 

significant improvement in her symptoms.  The patient  was discharged 

postpartum with a diagnosis of anemia and started on iron.  Her hemoglobin has 

come up slightly.  She is currently taking iron pills.  She is not breast-

feeding.





Impression





 Primary Impression:  


 Anxiety


 Additional Impression:  


 Anemia





Scribe Attestation


The scribe's documentation has been prepared under my direction and personally 

reviewed by me in its entirety. I confirm that the note above accurately 

reflects all work, treatment, procedures, and medical decision making performed 

by me.





Departure Information


Dispostion


Home / Self-Care





Prescriptions





Lorazepam (ATIVAN) 1 Mg Tab


1 MG PO Q6 for Anxiety, #20 TAB


   Prov: Pia Araya D.O.         7/7/17





Referrals


No Doctor, Assigned (PCP)








Rabia Markham M.D.





Forms


HOME CARE DOCUMENTATION FORM,                                                 

               IMPORTANT VISIT INFORMATION





Patient Instructions


Anxiety Body Response, Anxiety Disorder, ED Anemia Type Not Specified, My [facility]





Additional Instructions





Follow up with your PCP for restarting daily meds





Ativan - 1 tab every 4-6 hours for anxiety.





This medication will make you sleepy...you will not be able to care for the 

baby during this time.  No driving





Problem Qualifiers

## 2017-08-10 ENCOUNTER — HOSPITAL ENCOUNTER (OUTPATIENT)
Dept: HOSPITAL 45 - C.PAPS | Age: 27
Discharge: HOME | End: 2017-08-10
Attending: OBSTETRICS & GYNECOLOGY
Payer: COMMERCIAL

## 2017-08-10 DIAGNOSIS — Z01.419: Primary | ICD-10-CM

## 2017-08-18 ENCOUNTER — HOSPITAL ENCOUNTER (OUTPATIENT)
Dept: HOSPITAL 45 - C.PATHSPEC | Age: 27
Discharge: HOME | End: 2017-08-18
Attending: INTERNAL MEDICINE
Payer: COMMERCIAL

## 2017-08-18 DIAGNOSIS — L92.9: Primary | ICD-10-CM

## 2017-08-18 DIAGNOSIS — N76.89: ICD-10-CM

## 2017-11-09 ENCOUNTER — HOSPITAL ENCOUNTER (OUTPATIENT)
Dept: HOSPITAL 45 - C.MAMM | Age: 27
Discharge: HOME | End: 2017-11-09
Attending: PHYSICIAN ASSISTANT
Payer: COMMERCIAL

## 2017-11-09 DIAGNOSIS — N63.20: Primary | ICD-10-CM

## 2017-11-09 NOTE — MAMMOGRAPHY REPORT
ULTRASOUND OF LEFT BREAST: 11/9/2017

CLINICAL HISTORY: The patient is postpartum, delivered June 2017, and is not lactating.  She reports 
a tender erythematous lump on the left breast approximately 3 weeks ago.  She was antibiotics on Sund
ay and the lump opened and began to drain blood yesterday.  She reports that the lump feels smaller t
hernández it was previously although is .  





COMPARISON: No prior exams were available for comparison.   



TECHNIQUE:  Real-time targeted ultrasound of the left breast was performed.  



FINDINGS:  Real-time, high resolution targeted ultrasound was performed of the area of the tender yoon
thematous lump pointed out by the patient, in the left breast at 2:00 along the areolar edge.  At the
 site of the palpable lump there is an intradermal oval hypoechoic parallel 9 x 3 x 8 mm mass, with s
urrounding skin thickening.  Given that the mass is intradermal, it is benign and likely represents a
n infected/inflamed Zimmer gland cyst.  No fluid collection or suspicious masses seen within the 
underlying breast parenchyma.



IMPRESSION: ACR BI-RADS CATEGORY 2: BENIGN 

Intradermal hypoechoic 9 mm mass at the site of the tender erythematous lump along the left areolar e
dge at 2:00.  Given the intradermal location, it is benign and likely represents an infected/inflamed
 Zimmer gland cyst.  There is no sonographic evidence of malignancy.  Recommend clinical follow-u
p.



The patient was verbally notified of the results.

Fabiola Dykes M.D.  

ah/:11/9/2017 08:19:15  



Attending Technologist: Laurence JI(R)(M), [facility]

Imaging Technologist: Fabiola Dykes MD, [facility]

letter sent: Normal 1/2  

BI-RADS Code: ACR BI-RADS Category 2: Benign

## 2018-03-12 ENCOUNTER — HOSPITAL ENCOUNTER (EMERGENCY)
Dept: HOSPITAL 45 - C.EDB | Age: 28
Discharge: HOME | End: 2018-03-12
Payer: COMMERCIAL

## 2018-03-12 VITALS
BODY MASS INDEX: 30.44 KG/M2 | WEIGHT: 189.38 LBS | WEIGHT: 189.38 LBS | HEIGHT: 65.98 IN | HEIGHT: 65.98 IN | BODY MASS INDEX: 30.44 KG/M2

## 2018-03-12 VITALS — DIASTOLIC BLOOD PRESSURE: 67 MMHG | OXYGEN SATURATION: 96 % | HEART RATE: 88 BPM | SYSTOLIC BLOOD PRESSURE: 118 MMHG

## 2018-03-12 VITALS — TEMPERATURE: 98.42 F

## 2018-03-12 DIAGNOSIS — Z79.3: ICD-10-CM

## 2018-03-12 DIAGNOSIS — R07.89: Primary | ICD-10-CM

## 2018-03-12 LAB
BUN SERPL-MCNC: 8 MG/DL (ref 7–18)
CALCIUM SERPL-MCNC: 9.1 MG/DL (ref 8.5–10.1)
CO2 SERPL-SCNC: 23 MMOL/L (ref 21–32)
CREAT SERPL-MCNC: 0.65 MG/DL (ref 0.6–1.2)
EOSINOPHIL NFR BLD AUTO: 338 K/UL (ref 130–400)
GLUCOSE SERPL-MCNC: 80 MG/DL (ref 70–99)
HCT VFR BLD CALC: 43.1 % (ref 37–47)
HGB BLD-MCNC: 14.5 G/DL (ref 12–16)
INR PPP: 1 (ref 0.9–1.1)
MCH RBC QN AUTO: 29.2 PG (ref 25–34)
MCHC RBC AUTO-ENTMCNC: 33.6 G/DL (ref 32–36)
MCV RBC AUTO: 86.7 FL (ref 80–100)
PMV BLD AUTO: 10.9 FL (ref 7.4–10.4)
POTASSIUM SERPL-SCNC: 3.5 MMOL/L (ref 3.5–5.1)
PTT PATIENT: 26 SECONDS (ref 21–31)
RED CELL DISTRIBUTION WIDTH CV: 13.2 % (ref 11.5–14.5)
RED CELL DISTRIBUTION WIDTH SD: 41.8 FL (ref 36.4–46.3)
SODIUM SERPL-SCNC: 137 MMOL/L (ref 136–145)
WBC # BLD AUTO: 8.6 K/UL (ref 4.8–10.8)

## 2018-03-12 NOTE — DIAGNOSTIC IMAGING REPORT
CHEST ONE VIEW PORTABLE



HISTORY:      EVALUATE RESPIRATORY DISTRESS.DYSPNEA



COMPARISON: None.



FINDINGS: The lungs are clear. Cardiac silhouette is normal in size. No pleural

effusions. No pneumothorax.



IMPRESSION:

No acute process.







Electronically signed by:  Marcel Stahl M.D.

3/12/2018 8:38 PM



Dictated Date/Time:  3/12/2018 8:36 PM

## 2018-03-12 NOTE — EMERGENCY ROOM VISIT NOTE
History


Report prepared by Katja:  Silvio Bernal


Under the Supervision of:  Dr. Cristian Resendez M.D.


First contact with patient:  19:51


Chief Complaint:  SHOULDER PAIN


Stated Complaint:  PAIN AROUND LFT SHLDR BLADE,SOB/HURTS TO BREATH





History of Present Illness


The patient is a 27 year old female who presents to the Emergency Room with 

complaints of constant left shoulder pain starting this morning 0630 this 

morning. The patient currently rates her discomfort as a 6-7/10 in severity, 

and she states that the pain is worsened with deep breathing and moving. The 

patient states that she did not hurt that area recently. The patient notes that 

she has had a mild cough today. She denies any fever, nasal congestion, urinary 

symptoms, leg swelling, personal history of blood clots, and family history of 

blood clots. She notes that a few weeks ago she drove 6 hours in one day, 

though other than that she has not had any long car rides or plane rides. The 

patient reports that she gave birth 8 months ago. The patient also notes that 

she has a history of anxiety, and she states that this is causing her also to 

become short of breath.





   Source of History:  patient


   Onset:  0630


   Position:  shoulder (left)


   Symptom Intensity:  6-7/10


   Timing:  constant


   Modifying Factors (Worsening):  breathing, movement


   Associated Symptoms:  + cough, + SOB, No fevers, No urinary symptoms





Review of Systems


See HPI for pertinent positives & negatives. A total of 10 systems reviewed and 

were otherwise negative.





Past Medical & Surgical


Medical Problems:


(1) Pregnancy with 41 completed weeks gestation








Family History





Patient reports no known family medical history.





Social History


Smoking Status:  Never Smoker


Alcohol Use:  none


Drug Use:  none


Marital Status:  


Housing Status:  lives with family





Current/Historical Medications


Scheduled


Birth Control Pills (Birth Control Pills), 1 TAB PO DAILY


Multiple Vitamins W/ Minerals (Womens One Daily), 1 TAB PO DAILY





Allergies


Coded Allergies:  


     No Known Allergies (Unverified , 7/7/17)





Physical Exam


Vital Signs











  Date Time  Temp Pulse Resp B/P (MAP) Pulse Ox O2 Delivery O2 Flow Rate FiO2


 


3/12/18 21:08  88 18 118/67 96   


 


3/12/18 20:23  79      


 


3/12/18 19:34 36.9 95 16 156/94 98 Room Air  











Physical Exam


GENERAL: Patient is in no acute distress.


HEENT: No acute trauma, normocephalic atraumatic, mucous membranes moist, no 

nasal congestion, no scleral icterus.


NECK: No stridor, no adenopathy, no meningismus, trachea is midline.


LUNGS: Clear to auscultation bilaterally, no wheeze, no rhonchi, breath sounds 

equal.


HEART: Without murmurs gallops or rubs, regular rate and rhythm.


ABDOMEN: Soft, nontender, bowel sounds positive, no hernias, no peritonitis.


EXTREMITIES: No cyanosis or edema, full range of motion of all the joints 

without pain or difficulty, no signs for acute trauma.


NEUROLOGIC: Oriented x 3, no acute motor or sensory deficits, no focal weakness.


SKIN: No rash, no jaundice, no diaphoresis.





Medical Decision & Procedures


ER Provider


Diagnostic Interpretation:


Radiology results as stated below per my review and radiologist interpretation:











CHEST ONE VIEW PORTABLE





HISTORY:      EVALUATE RESPIRATORY DISTRESS.DYSPNEA





COMPARISON: None.





FINDINGS: The lungs are clear. Cardiac silhouette is normal in size. No pleural


effusions. No pneumothorax.





IMPRESSION:


No acute process.





Electronically signed by:  Marcel Stahl M.D.


3/12/2018 8:38 PM





Dictated Date/Time:  3/12/2018 8:36 PM





Laboratory Results


3/12/18 20:15








3/12/18 20:15

















Test


  3/12/18


20:15 3/12/18


20:22


 


Red Blood Count


  4.97 M/uL


(4.2-5.4) 


 


 


Mean Corpuscular Volume


  86.7 fL


() 


 


 


Mean Corpuscular Hemoglobin


  29.2 pg


(25-34) 


 


 


Mean Corpuscular Hemoglobin


Concent 33.6 g/dl


(32-36) 


 


 


RDW Standard Deviation


  41.8 fL


(36.4-46.3) 


 


 


RDW Coefficient of Variation


  13.2 %


(11.5-14.5) 


 


 


Mean Platelet Volume


  10.9 fL


(7.4-10.4) 


 


 


Prothrombin Time


  10.1 SECONDS


(9.0-12.0) 


 


 


Prothromb Time International


Ratio 1.0 (0.9-1.1) 


  


 


 


Activated Partial


Thromboplast Time 26.0 SECONDS


(21.0-31.0) 


 


 


Partial Thromboplastin Ratio 1.0  


 


Anion Gap


  10.0 mmol/L


(3-11) 


 


 


Est Creatinine Clear Calc


Drug Dose 143.5 ml/min 


  


 


 


Estimated GFR (


American) 141.0 


  


 


 


Estimated GFR (Non-


American 121.7 


  


 


 


BUN/Creatinine Ratio 12.6 (10-20)  


 


Calcium Level


  9.1 mg/dl


(8.5-10.1) 


 


 


Bedside D-Dimer


  


  445 ng/mlFEU


(0-450)








Laboratory results reviewed by me.





Medications Administered











 Medications


  (Trade)  Dose


 Ordered  Sig/Jake


 Route  Start Time


 Stop Time Status Last Admin


Dose Admin


 


 Ibuprofen


  (Motrin Tab)  600 mg  NOW  STAT


 PO  3/12/18 20:57


 3/12/18 20:58 DC 3/12/18 21:05


600 MG











ECG Per My Interpretation


Indication:  back/shoulder pain, SOB/dyspnea


Rate (beats per minute):  96


Rhythm:  sinus with SA


Findings:  other (No ST elevation. No PVC)





ED Course


1951: The patient was evaluated in room A12. A complete history and physical 

exam was performed.





2055: Reevaluated the patient. Discussed results and discharge instructions: 

She verbalized understanding and agreement. The patient is ready for discharge.





2057: Motrin 600mg PO





Medical Decision


Differential diagnoses include: musculoskeletal pain, nerve impingement, 

pneumothorax, pneumonia, cardiac ischemia, shingles, PE.





There is no leukocytosis or concerning anemia.  No significant electrolyte 

abnormality or kidney failure.  EKG shows a sinus rhythm with some sinus 

arrhythmia, no acute ischemia.  Chest film does not show pneumonia, 

pneumothorax or mediastinal widening.  D-dimer testing was negative.  With the 

negative d-dimer and my low suspicion for PE, I will stop the workup for this 

diagnosis.





The patient presents with left posterior pleuritic chest pain.  There was no 

rash to suggest shingles.  The pain did worsen with movement.  Patient was not 

hypoxic or toxic.





The patient's pain is likely musculoskeletal.  She was reassured.  She was 

given oral Motrin, she is being discharged home.  If worsening, she can return.





Medication Reconcilliation


Current Medication List:  was personally reviewed by me





Blood Pressure Screening


Patient's blood pressure:  Elevated blood pressure


Blood pressure disposition:  Elevated BP felt to be situational





Impression





 Primary Impression:  


 Pleuritic chest pain





Scribe Attestation


The scribe's documentation has been prepared under my direction and personally 

reviewed by me in its entirety. I confirm that the note above accurately 

reflects all work, treatment, procedures, and medical decision making performed 

by me.





Departure Information


Dispostion


Home / Self-Care





Referrals


Rabia Markham M.D. (PCP)





Forms


HOME CARE DOCUMENTATION FORM,                                                 

               IMPORTANT VISIT INFORMATION





Patient Instructions


My [facility]





Additional Instructions





heat or ice may help


advil 600 mg 3x per day for 5 days


rest


return if worsening or have fever





lab testing and imaging were all ok today as we discussed